# Patient Record
Sex: FEMALE | Race: WHITE | NOT HISPANIC OR LATINO | Employment: OTHER | URBAN - METROPOLITAN AREA
[De-identification: names, ages, dates, MRNs, and addresses within clinical notes are randomized per-mention and may not be internally consistent; named-entity substitution may affect disease eponyms.]

---

## 2017-05-31 ENCOUNTER — TELEPHONE (OUTPATIENT)
Dept: MEDICAL GROUP | Facility: LAB | Age: 37
End: 2017-05-31

## 2017-05-31 NOTE — TELEPHONE ENCOUNTER
Schedule for AWV with Dr. Dominguez on June 14 @ 9am.  Advised to arrive at least 15 minutes before appointment time.

## 2017-08-31 PROBLEM — I10 ESSENTIAL HYPERTENSION: Status: ACTIVE | Noted: 2017-08-31

## 2017-08-31 PROBLEM — E11.9 TYPE 2 DIABETES MELLITUS WITHOUT COMPLICATION, WITHOUT LONG-TERM CURRENT USE OF INSULIN (HCC): Status: ACTIVE | Noted: 2017-08-31

## 2017-08-31 PROBLEM — F51.04 PSYCHOPHYSIOLOGICAL INSOMNIA: Status: ACTIVE | Noted: 2017-08-31

## 2017-08-31 PROBLEM — F31.9 BIPOLAR 1 DISORDER (HCC): Status: ACTIVE | Noted: 2017-08-31

## 2018-07-03 PROBLEM — M54.6 THORACIC SPINE PAIN: Status: ACTIVE | Noted: 2018-07-03

## 2018-07-03 PROBLEM — G89.29 CHRONIC BILATERAL LOW BACK PAIN: Status: ACTIVE | Noted: 2018-07-03

## 2018-07-03 PROBLEM — M54.50 CHRONIC BILATERAL LOW BACK PAIN: Status: ACTIVE | Noted: 2018-07-03

## 2018-08-09 ENCOUNTER — OFFICE VISIT (OUTPATIENT)
Dept: NEUROLOGY | Facility: MEDICAL CENTER | Age: 38
End: 2018-08-09
Payer: MEDICARE

## 2018-08-09 VITALS
WEIGHT: 187.6 LBS | TEMPERATURE: 99.2 F | HEART RATE: 86 BPM | SYSTOLIC BLOOD PRESSURE: 136 MMHG | DIASTOLIC BLOOD PRESSURE: 100 MMHG | HEIGHT: 66 IN | BODY MASS INDEX: 30.15 KG/M2

## 2018-08-09 DIAGNOSIS — R53.1 RIGHT SIDED WEAKNESS: ICD-10-CM

## 2018-08-09 DIAGNOSIS — G43.011 INTRACTABLE MIGRAINE WITHOUT AURA AND WITH STATUS MIGRAINOSUS: ICD-10-CM

## 2018-08-09 PROCEDURE — 99204 OFFICE O/P NEW MOD 45 MIN: CPT | Performed by: PHYSICIAN ASSISTANT

## 2018-08-09 RX ORDER — RIZATRIPTAN BENZOATE 5 MG/1
5-10 TABLET ORAL
Qty: 10 TAB | Refills: 3 | Status: SHIPPED | OUTPATIENT
Start: 2018-08-09 | End: 2020-09-28

## 2018-08-09 RX ORDER — ONDANSETRON 4 MG/1
4-8 TABLET, ORALLY DISINTEGRATING ORAL EVERY 8 HOURS PRN
Qty: 10 TAB | Refills: 11 | Status: SHIPPED | OUTPATIENT
Start: 2018-08-09 | End: 2020-09-28

## 2018-08-09 NOTE — PATIENT INSTRUCTIONS
Plan: migraine without aura/possible stroke    Acute/Rescue Medications: max use 9 days monthly - use calendar to track and bring to next visit    Triptan - maxalt 10 mg to take at onset of migraine  Nausea medication - zofran 4-8 mg with migraine    Daily Preventative Treatment:  Vitamins - handout provided  Daily medicine - hold off for now.  Would likely need botox due to current meds she is taking    MRI with and without - MRA brain due to family members with caputo caputo and pt having recent episode of concern for stroke and hypertensive emergency
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
## 2018-08-10 ENCOUNTER — TELEPHONE (OUTPATIENT)
Dept: NEUROLOGY | Facility: MEDICAL CENTER | Age: 38
End: 2018-08-10

## 2018-08-10 NOTE — TELEPHONE ENCOUNTER
Patient left  requesting her medications to be sent to [facility] in [place] instead of [facility]. I called [facility] pharmacy and was transferred to the pharmacists  because they are busy, I left a detailed message about the 2 medications rizatriptan and ondansetron, called them as a new script. Also left provide info and my info.

## 2019-08-20 PROBLEM — E78.2 MIXED HYPERLIPIDEMIA: Status: ACTIVE | Noted: 2019-08-20

## 2023-03-16 PROBLEM — Q89.01 SPLEEN ABSENT: Status: ACTIVE | Noted: 2023-03-16

## 2023-03-16 PROBLEM — Q89.09 SPLEEN ANOMALY: Status: ACTIVE | Noted: 2023-03-16

## 2023-03-16 PROBLEM — R91.1 LUNG NODULE: Status: ACTIVE | Noted: 2023-03-16

## 2023-04-11 PROBLEM — R10.2 PELVIC PAIN: Status: ACTIVE | Noted: 2023-04-11

## 2023-04-11 PROBLEM — N83.201 RIGHT OVARIAN CYST: Status: ACTIVE | Noted: 2023-04-11

## 2023-04-11 PROBLEM — D21.9 FIBROIDS: Status: ACTIVE | Noted: 2023-04-11

## 2023-07-10 PROBLEM — F41.8 ANXIETY ABOUT HEALTH: Status: ACTIVE | Noted: 2023-07-10

## 2023-07-10 PROBLEM — R45.89 ANXIETY ABOUT HEALTH: Status: ACTIVE | Noted: 2023-07-10

## 2023-11-30 PROBLEM — D50.0 IRON DEFICIENCY ANEMIA DUE TO CHRONIC BLOOD LOSS: Status: ACTIVE | Noted: 2023-11-30

## 2025-01-19 ASSESSMENT — ENCOUNTER SYMPTOMS
RESPIRATORY SYMPTOMS COMMENTS: SOME TIMES
DYSPNEA AT REST: 0
HEMOPTYSIS: 0
SHORTNESS OF BREATH: 1
WHEEZING: 0
CHEST TIGHTNESS: 1

## 2025-01-22 ENCOUNTER — OFFICE VISIT (OUTPATIENT)
Dept: SLEEP MEDICINE | Facility: MEDICAL CENTER | Age: 45
End: 2025-01-22
Attending: INTERNAL MEDICINE
Payer: MEDICARE

## 2025-01-22 VITALS
HEIGHT: 65 IN | BODY MASS INDEX: 30.99 KG/M2 | DIASTOLIC BLOOD PRESSURE: 74 MMHG | HEART RATE: 92 BPM | SYSTOLIC BLOOD PRESSURE: 124 MMHG | WEIGHT: 186 LBS | OXYGEN SATURATION: 99 %

## 2025-01-22 DIAGNOSIS — D86.9 SARCOIDOSIS: ICD-10-CM

## 2025-01-22 DIAGNOSIS — R06.02 SOB (SHORTNESS OF BREATH): ICD-10-CM

## 2025-01-22 DIAGNOSIS — D86.89 SARCOIDOSIS OF OTHER SITES: ICD-10-CM

## 2025-01-22 PROCEDURE — 99212 OFFICE O/P EST SF 10 MIN: CPT | Performed by: INTERNAL MEDICINE

## 2025-01-22 PROCEDURE — 99204 OFFICE O/P NEW MOD 45 MIN: CPT | Performed by: INTERNAL MEDICINE

## 2025-01-22 ASSESSMENT — ENCOUNTER SYMPTOMS
DIARRHEA: 0
DOUBLE VISION: 0
EYE DISCHARGE: 0
DEPRESSION: 0
CHILLS: 0
SORE THROAT: 0
BACK PAIN: 0
PALPITATIONS: 0
EYE REDNESS: 0
CONSTIPATION: 0
NECK PAIN: 0
MYALGIAS: 0
TREMORS: 0
SPUTUM PRODUCTION: 0
HEARTBURN: 0
WEAKNESS: 0
WHEEZING: 0
CLAUDICATION: 0
EYE PAIN: 0
BLURRED VISION: 0
SPEECH CHANGE: 0
HEMOPTYSIS: 0
SINUS PAIN: 0
DIAPHORESIS: 0
PND: 0
VOMITING: 0
SHORTNESS OF BREATH: 1
NAUSEA: 0
FALLS: 0
FEVER: 0
PHOTOPHOBIA: 0
ORTHOPNEA: 0
COUGH: 0
ABDOMINAL PAIN: 0
STRIDOR: 0
FOCAL WEAKNESS: 0
WEIGHT LOSS: 0
HEADACHES: 0
DIZZINESS: 0

## 2025-01-22 ASSESSMENT — FIBROSIS 4 INDEX: FIB4 SCORE: 0.27
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
## 2025-01-29 ENCOUNTER — NON-PROVIDER VISIT (OUTPATIENT)
Dept: SLEEP MEDICINE | Facility: MEDICAL CENTER | Age: 45
End: 2025-01-29
Attending: INTERNAL MEDICINE
Payer: MEDICARE

## 2025-01-29 VITALS — BODY MASS INDEX: 31.16 KG/M2 | HEIGHT: 65 IN | WEIGHT: 187 LBS

## 2025-01-29 DIAGNOSIS — D86.89 SARCOIDOSIS OF OTHER SITES: ICD-10-CM

## 2025-01-29 DIAGNOSIS — D86.9 SARCOIDOSIS: ICD-10-CM

## 2025-01-29 PROCEDURE — 94729 DIFFUSING CAPACITY: CPT | Performed by: INTERNAL MEDICINE

## 2025-01-29 PROCEDURE — 94060 EVALUATION OF WHEEZING: CPT | Performed by: INTERNAL MEDICINE

## 2025-01-29 PROCEDURE — 94726 PLETHYSMOGRAPHY LUNG VOLUMES: CPT | Performed by: INTERNAL MEDICINE

## 2025-01-29 PROCEDURE — 94729 DIFFUSING CAPACITY: CPT | Mod: 26 | Performed by: INTERNAL MEDICINE

## 2025-01-29 PROCEDURE — 94060 EVALUATION OF WHEEZING: CPT | Mod: 26 | Performed by: INTERNAL MEDICINE

## 2025-01-29 PROCEDURE — 94726 PLETHYSMOGRAPHY LUNG VOLUMES: CPT | Mod: 26 | Performed by: INTERNAL MEDICINE

## 2025-01-29 ASSESSMENT — FIBROSIS 4 INDEX: FIB4 SCORE: 0.25

## 2025-01-29 NOTE — PROCEDURES
Technician: ZEFERINO Singleton    Technician Comment:  Good patient effort & cooperation.  The results of this test meet the ATS/ERS standards for acceptability & reproducibility.  Test was performed on the [employer] Plethysmograph-Elite DX system.  Predicted values were GLI-2012 for spirometry, GLI-2020 for DLCO, ITS for Lung Volumes.  The DLCO was uncorrected for Hgb.  A bronchodilator of Albuterol HFA -2puffs via spacer administered.  DLCO performed during dilation period.    Interpretation:

## 2025-03-06 ENCOUNTER — HOSPITAL ENCOUNTER (OUTPATIENT)
Dept: RADIOLOGY | Facility: MEDICAL CENTER | Age: 45
End: 2025-03-06
Attending: INTERNAL MEDICINE

## 2025-03-06 ENCOUNTER — OFFICE VISIT (OUTPATIENT)
Dept: SLEEP MEDICINE | Facility: MEDICAL CENTER | Age: 45
End: 2025-03-06
Attending: INTERNAL MEDICINE
Payer: MEDICARE

## 2025-03-06 VITALS
HEIGHT: 65 IN | BODY MASS INDEX: 31.16 KG/M2 | HEART RATE: 88 BPM | DIASTOLIC BLOOD PRESSURE: 70 MMHG | WEIGHT: 187 LBS | SYSTOLIC BLOOD PRESSURE: 128 MMHG | OXYGEN SATURATION: 98 %

## 2025-03-06 DIAGNOSIS — D86.9 SARCOIDOSIS: ICD-10-CM

## 2025-03-06 DIAGNOSIS — J06.9 UPPER RESPIRATORY TRACT INFECTION, UNSPECIFIED TYPE: ICD-10-CM

## 2025-03-06 DIAGNOSIS — J45.40 MODERATE PERSISTENT REACTIVE AIRWAY DISEASE WITHOUT COMPLICATION: ICD-10-CM

## 2025-03-06 PROCEDURE — 99212 OFFICE O/P EST SF 10 MIN: CPT | Performed by: INTERNAL MEDICINE

## 2025-03-06 PROCEDURE — 99214 OFFICE O/P EST MOD 30 MIN: CPT | Performed by: INTERNAL MEDICINE

## 2025-03-06 PROCEDURE — 3078F DIAST BP <80 MM HG: CPT | Performed by: INTERNAL MEDICINE

## 2025-03-06 PROCEDURE — 3074F SYST BP LT 130 MM HG: CPT | Performed by: INTERNAL MEDICINE

## 2025-03-06 RX ORDER — ALBUTEROL SULFATE 90 UG/1
1-2 INHALANT RESPIRATORY (INHALATION) EVERY 4 HOURS PRN
Qty: 1 EACH | Refills: 6 | Status: SHIPPED | OUTPATIENT
Start: 2025-03-06

## 2025-03-06 RX ORDER — AZITHROMYCIN 250 MG/1
TABLET, FILM COATED ORAL
Qty: 6 TABLET | Refills: 0 | Status: SHIPPED | OUTPATIENT
Start: 2025-03-06

## 2025-03-06 ASSESSMENT — ENCOUNTER SYMPTOMS
DEPRESSION: 0
SORE THROAT: 0
DIAPHORESIS: 0
HEARTBURN: 0
BACK PAIN: 0
BLURRED VISION: 0
SHORTNESS OF BREATH: 1
FOCAL WEAKNESS: 0
FEVER: 0
CHILLS: 0
WEAKNESS: 0
SPEECH CHANGE: 0
VOMITING: 0
PND: 0
SINUS PAIN: 0
EYE PAIN: 0
EYE DISCHARGE: 0
DOUBLE VISION: 0
HEADACHES: 0
ABDOMINAL PAIN: 1
CONSTIPATION: 0
PHOTOPHOBIA: 0
DIARRHEA: 0
EYE REDNESS: 0
PALPITATIONS: 0
TREMORS: 0
CLAUDICATION: 0
NECK PAIN: 0
WHEEZING: 0
SPUTUM PRODUCTION: 0
DIZZINESS: 0
COUGH: 1
WEIGHT LOSS: 0
NAUSEA: 0
FALLS: 0
HEMOPTYSIS: 0
STRIDOR: 0
ORTHOPNEA: 0
MYALGIAS: 0

## 2025-03-06 ASSESSMENT — FIBROSIS 4 INDEX: FIB4 SCORE: 0.25

## 2025-03-06 NOTE — PATIENT INSTRUCTIONS
Consider decongestant (phenylephrine) for up to three days  Can also try flonase (fluticasone) nasal spray; one spray each nostril twice daily as long as needed

## 2025-03-06 NOTE — PROGRESS NOTES
[facility]    Date of Service: 3/6/2025     Chief Complaint:   Chief Complaint   Patient presents with    Follow-Up     LAST SEEN 1/22/25    Results     PFT 1/29/25  LABS 1/27/25   CT CHEST 1/22/25       HPI:   Daniela Tristan is a 45 y.o. female presents to clinic today for f/u sarcoidosis last seen by me for initial consultation 1/22/25. PMHx is significant for PTSD, and disability due to mental health issues with history of head trauma. She also has hypertension currently well-controlled. She smoked briefly but less than 10-pack-year history. Family history is generally unknown. In February 2023 she developed abdominal and chest pain which was nonspecific and seen in the ER. CT abdomen and pelvis at that time showed hypodensities in the spleen. She was seen by oncology and monitored however over the past year pain persisted and the abnormalities in the spleen were enlarging. She underwent splenectomy at [facility] in May 2024 and per reports pathology was consistent with sarcoidosis. CT chest in [place] showed b/l subcentimeter pulmonary nodules and one enlarged pre-carinal lymph node which has been stable from September 2023 to March 2024. These are all stable based on CT from 1/22/25. NO interstitial changes. There is scarring vs atelectasis LLL. She was seen by pulmonary at [facility] in September with recommendations to continue monitoring. She did have a very mild elevation of angiotensin-converting enzyme around the time of her splenectomy and was referred to rheumatology but has not been able to follow-up. She continues to have left-sided abdominal pain that is perhaps mildly decreased in severity since her splenectomy but not resolved. She also has nonspecific chest pain which can involve her shoulder and chest described as sharp and stabbing. This can occur for 45 minutes at a time 1-2 times per week. She denies cough but does get short of breath with significant physical  exertion. No wheezing. No gastroesophageal reflux. No fevers, chills, night sweats, weight loss. No rash. She has had ophthalmology examination and no evidence of ocular sarcoid. In addition to updated CT, we ordered PFT which showed mild reduction in both fEV1 and FVC pre-BD with normalization post -BD with +response, low normal DLCO and supranormal DLCO. Repeat ACE was normal, ESR and CRP not elevated. No lymphopenia on CBC. ECG was ordered but not yet done. She feels stable to improved other than upper airway congestion and cough that began last night. No fever.     Oxygen Use: none    Tobacco use  History: <10 pk year hx; tobacco free                                                                       PFT Trends   Date 25        FVC 2.98        TLC 4.21        DLCO 27.65          ILD Workup:  HRCT:  CTD Panel:  Echo: 25 wnls  HP Antibodies:     Past Medical History:   Diagnosis Date    Arthritis     ASTHMA     Bipolar 1 disorder, mixed, severe (HCC)     Depression     Hypertension     Migraine     Psychiatric disorder     anxiety/Bipolar       Past Surgical History:   Procedure Laterality Date    PRIMARY C SECTION      CS x 4 + BTL via pfannenstiel incision x 2 locations       Social History     Socioeconomic History    Marital status: Legally      Spouse name: Not on file    Number of children: Not on file    Years of education: Not on file    Highest education level: Not on file   Occupational History    Not on file   Tobacco Use    Smoking status: Former     Current packs/day: 0.00     Types: Cigarettes     Quit date: 2018     Years since quittin.1    Smokeless tobacco: Never   Vaping Use    Vaping status: Never Used   Substance and Sexual Activity    Alcohol use: No    Drug use: No    Sexual activity: Yes     Partners: Male   Other Topics Concern    Not on file   Social History Narrative    Not on file     Social Drivers of Health     Financial Resource Strain: Not on file   Food  Insecurity: Not on file   Transportation Needs: Not on file   Physical Activity: Not on file   Stress: Not on file   Social Connections: Not on file   Intimate Partner Violence: Low Risk  (5/15/2024)    [facility]    History of Abuse     Have you ever been afraid of, threatened, neglected, or abused by someone?: No   Housing Stability: Not on file          Family History   Problem Relation Age of Onset    Diabetes Mother     Hypertension Mother     Hyperlipidemia Mother     Diabetes Father     Hypertension Father     Hyperlipidemia Father     Lung Disease Brother         asthma    Stroke Daughter         caputo     Stroke Daughter     Psychiatric Illness Son     Psychiatric Illness Son     Psychiatric Illness Son     Cancer Paternal Grandfather        Current Outpatient Medications on File Prior to Visit   Medication Sig Dispense Refill    amLODIPine (NORVASC) 5 MG Tab Take 1 Tablet by mouth every day. 30 Tablet 1    telmisartan (MICARDIS) 80 MG Tab Take 1 Tablet by mouth every day. 30 Tablet 1    meloxicam (MOBIC) 7.5 MG Tab Take 1 Tablet by mouth every day. 30 Tablet 1     No current facility-administered medications on file prior to visit.       Allergies: Amoxicillin, Doxycycline, and Penicillins    Review of Systems   Constitutional:  Negative for chills, diaphoresis, fever, malaise/fatigue and weight loss.   HENT:  Negative for congestion, ear discharge, ear pain, hearing loss, nosebleeds, sinus pain, sore throat and tinnitus.    Eyes:  Negative for blurred vision, double vision, photophobia, pain, discharge and redness.   Respiratory:  Positive for cough and shortness of breath. Negative for hemoptysis, sputum production, wheezing and stridor.    Cardiovascular:  Negative for chest pain, palpitations, orthopnea, claudication, leg swelling and PND.   Gastrointestinal:  Positive for abdominal pain. Negative for constipation,  "diarrhea, heartburn, nausea and vomiting.   Genitourinary:  Negative for dysuria and urgency.   Musculoskeletal:  Negative for back pain, falls, joint pain, myalgias and neck pain.   Skin:  Negative for itching and rash.   Neurological:  Negative for dizziness, tremors, speech change, focal weakness, weakness and headaches.   Endo/Heme/Allergies:  Negative for environmental allergies.   Psychiatric/Behavioral:  Negative for depression.        Ambulatory Vitals  Encounter Vitals  Blood Pressure: 128/70  Pulse: 88  Pulse Oximetry: 98 %  Weight: 84.8 kg (187 lb)  Height: 165.1 cm (5' 5\")  BMI (Calculated): 31.12     Physical Exam  Constitutional:       General: She is not in acute distress.     Appearance: Normal appearance. She is well-developed and normal weight.   HENT:      Head: Normocephalic and atraumatic.      Right Ear: External ear normal.      Left Ear: External ear normal.      Nose: Nose normal. No congestion.      Mouth/Throat:      Mouth: Mucous membranes are moist.      Pharynx: Oropharynx is clear. No oropharyngeal exudate.   Eyes:      General: No scleral icterus.     Extraocular Movements: Extraocular movements intact.      Conjunctiva/sclera: Conjunctivae normal.      Pupils: Pupils are equal, round, and reactive to light.   Neck:      Vascular: No JVD.      Trachea: No tracheal deviation.   Cardiovascular:      Rate and Rhythm: Normal rate and regular rhythm.      Heart sounds: Normal heart sounds. No murmur heard.     No friction rub. No gallop.   Pulmonary:      Effort: Pulmonary effort is normal. No accessory muscle usage or respiratory distress.      Breath sounds: Normal breath sounds. No wheezing or rales.   Abdominal:      General: There is no distension.      Palpations: Abdomen is soft.      Tenderness: There is no abdominal tenderness.   Musculoskeletal:         General: No tenderness or deformity. Normal range of motion.      Cervical back: Normal range of motion and neck supple.      " Right lower leg: No edema.      Left lower leg: No edema.   Lymphadenopathy:      Cervical: No cervical adenopathy.   Skin:     General: Skin is warm and dry.      Findings: No rash.      Nails: There is no clubbing.   Neurological:      Mental Status: She is alert and oriented to person, place, and time.      Cranial Nerves: No cranial nerve deficit.      Gait: Gait normal.   Psychiatric:         Behavior: Behavior normal.         Vaccinations:    Flu: 10/2024  Pneumonia: 4/2024  Covid: 4/2021    Assessment/Plan:    Assessment & Plan  Moderate persistent reactive airway disease without complication  Pt with PFT e/o at least mild RAD not atypical of sarcoid pts for which we will start prn REYNA use. She was instructed on use with spacer in clinic today. I also recommended intranasal steroids and temporary decongestant for URI  Orders:    albuterol 108 (90 Base) MCG/ACT Aero Soln inhalation aerosol; Inhale 1-2 Puffs every four hours as needed for Shortness of Breath.    Spacer/Aero-Holding Chambers Device; 1 Device every four hours as needed (sob, cough, wheezing).    PULMONARY FUNCTION TESTS -Test requested: Complete Pulmonary Function Test; Future    azithromycin (ZITHROMAX) 250 MG Tab; Take 2 tablets on day 1, then take 1 tablet a day for 4 days.    Sarcoidosis  We know this affected her spleen and she appears to have pulmonary involvement based on nodules and LAD but no clear e/o of active disease necessitating oral steroids. Will start with prn REYNA. Repeat PFT in 6 mos and CT chest in one year. Encouraged her to schedule ECG.   Orders:    PULMONARY FUNCTION TESTS -Test requested: Complete Pulmonary Function Test; Future    Upper respiratory tract infection, unspecified type  Decongestant, nasal steroids and emergency azithromycin provided            Return in about 4 months (around 7/6/2025) for pulmonary function test at time of f/u .     This note was generated using voice recognition software which has a  chance of producing errors of grammar and possibly content.  I have made every reasonable attempt to find and correct any obvious errors, but it should be expected that some may not be found prior to finalization of this note.    Time spent in record review prior to patient arrival, reviewing results, and in face-to-face encounter totaled 40 min, excluding any procedures if performed.    Sheri Bell M.D.

## 2025-07-10 ENCOUNTER — APPOINTMENT (OUTPATIENT)
Dept: SLEEP MEDICINE | Facility: MEDICAL CENTER | Age: 45
End: 2025-07-10
Payer: MEDICARE